# Patient Record
Sex: MALE | Race: WHITE
[De-identification: names, ages, dates, MRNs, and addresses within clinical notes are randomized per-mention and may not be internally consistent; named-entity substitution may affect disease eponyms.]

---

## 2018-10-04 ENCOUNTER — HOSPITAL ENCOUNTER (OUTPATIENT)
Dept: HOSPITAL 82 - ULTRASND | Age: 73
Discharge: HOME | DRG: 434 | End: 2018-10-04
Attending: INTERNAL MEDICINE
Payer: OTHER GOVERNMENT

## 2018-10-04 DIAGNOSIS — K74.69: Primary | ICD-10-CM

## 2018-10-04 PROCEDURE — P9047 ALBUMIN (HUMAN), 25%, 50ML: HCPCS

## 2018-10-04 PROCEDURE — 0W9G3ZZ DRAINAGE OF PERITONEAL CAVITY, PERCUTANEOUS APPROACH: ICD-10-PCS | Performed by: RADIOLOGY

## 2018-10-25 ENCOUNTER — HOSPITAL ENCOUNTER (EMERGENCY)
Dept: HOSPITAL 82 - ED | Age: 73
Discharge: ANOTHER HOSPITAL | DRG: 948 | End: 2018-10-25
Payer: OTHER GOVERNMENT

## 2018-10-25 VITALS — WEIGHT: 242.51 LBS | HEIGHT: 69 IN | BODY MASS INDEX: 35.92 KG/M2

## 2018-10-25 VITALS — SYSTOLIC BLOOD PRESSURE: 113 MMHG | DIASTOLIC BLOOD PRESSURE: 61 MMHG

## 2018-10-25 DIAGNOSIS — M10.9: ICD-10-CM

## 2018-10-25 DIAGNOSIS — K75.81: ICD-10-CM

## 2018-10-25 DIAGNOSIS — R27.0: ICD-10-CM

## 2018-10-25 DIAGNOSIS — K74.60: ICD-10-CM

## 2018-10-25 DIAGNOSIS — Z86.73: ICD-10-CM

## 2018-10-25 DIAGNOSIS — R41.0: Primary | ICD-10-CM

## 2019-01-24 ENCOUNTER — HOSPITAL ENCOUNTER (OUTPATIENT)
Dept: HOSPITAL 82 - ED | Age: 74
Setting detail: OBSERVATION
LOS: 2 days | Discharge: HOME HEALTH | DRG: 312 | End: 2019-01-26
Attending: INTERNAL MEDICINE | Admitting: INTERNAL MEDICINE
Payer: OTHER GOVERNMENT

## 2019-01-24 VITALS — BODY MASS INDEX: 35.73 KG/M2 | WEIGHT: 249.56 LBS | HEIGHT: 70 IN

## 2019-01-24 VITALS — DIASTOLIC BLOOD PRESSURE: 74 MMHG | SYSTOLIC BLOOD PRESSURE: 125 MMHG

## 2019-01-24 VITALS — SYSTOLIC BLOOD PRESSURE: 122 MMHG | DIASTOLIC BLOOD PRESSURE: 71 MMHG

## 2019-01-24 DIAGNOSIS — S50.311A: ICD-10-CM

## 2019-01-24 DIAGNOSIS — R55: Primary | ICD-10-CM

## 2019-01-24 DIAGNOSIS — S01.01XA: ICD-10-CM

## 2019-01-24 DIAGNOSIS — I50.9: ICD-10-CM

## 2019-01-24 DIAGNOSIS — I25.10: ICD-10-CM

## 2019-01-24 DIAGNOSIS — E83.42: ICD-10-CM

## 2019-01-24 DIAGNOSIS — N18.3: ICD-10-CM

## 2019-01-24 DIAGNOSIS — K74.69: ICD-10-CM

## 2019-01-24 DIAGNOSIS — Z79.4: ICD-10-CM

## 2019-01-24 DIAGNOSIS — T75.89XD: ICD-10-CM

## 2019-01-24 DIAGNOSIS — E11.22: ICD-10-CM

## 2019-01-24 DIAGNOSIS — Z95.1: ICD-10-CM

## 2019-01-24 DIAGNOSIS — M19.90: ICD-10-CM

## 2019-01-24 DIAGNOSIS — E87.5: ICD-10-CM

## 2019-01-24 DIAGNOSIS — Z79.1: ICD-10-CM

## 2019-01-24 DIAGNOSIS — K75.81: ICD-10-CM

## 2019-01-24 DIAGNOSIS — Z79.899: ICD-10-CM

## 2019-01-24 DIAGNOSIS — R18.8: ICD-10-CM

## 2019-01-24 DIAGNOSIS — K76.6: ICD-10-CM
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
## 2019-01-24 DIAGNOSIS — N17.9: ICD-10-CM
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
## 2019-01-24 DIAGNOSIS — E72.20: ICD-10-CM

## 2019-01-24 LAB
ALBUMIN SERPL-MCNC: 3.4 G/DL (ref 3.2–5)
ALP SERPL-CCNC: 168 U/L (ref 38–126)
ANION GAP SERPL CALCULATED.3IONS-SCNC: 14 MMOL/L
ANION GAP SERPL CALCULATED.3IONS-SCNC: 16 MMOL/L
AST SERPL-CCNC: 41 U/L (ref 19–48)
BASOPHILS NFR BLD AUTO: 1 % (ref 0–3)
BUN SERPL-MCNC: 23 MG/DL (ref 8–23)
BUN SERPL-MCNC: 23 MG/DL (ref 8–23)
BUN/CREAT SERPL: 12
BUN/CREAT SERPL: 13
CHLORIDE SERPL-SCNC: 105 MMOL/L (ref 95–108)
CHLORIDE SERPL-SCNC: 106 MMOL/L (ref 95–108)
CO2 SERPL-SCNC: 20 MMOL/L (ref 22–30)
CO2 SERPL-SCNC: 22 MMOL/L (ref 22–30)
CREAT SERPL-MCNC: 1.7 MG/DL (ref 0.7–1.3)
CREAT SERPL-MCNC: 1.9 MG/DL (ref 0.7–1.3)
EOSINOPHIL NFR BLD AUTO: 4 % (ref 0–8)
ERYTHROCYTE [DISTWIDTH] IN BLOOD BY AUTOMATED COUNT: 14.2 % (ref 11.5–15.5)
HCT VFR BLD AUTO: 40 % (ref 39–50)
HGB BLD-MCNC: 13.3 G/DL (ref 14–18)
IMM GRANULOCYTES NFR BLD: 0.6 % (ref 0–5)
LYMPHOCYTES NFR BLD: 11 % (ref 15–41)
MCH RBC QN AUTO: 34.3 PG  CALC (ref 26–32)
MCHC RBC AUTO-ENTMCNC: 33.3 G/L CALC (ref 32–36)
MCV RBC AUTO: 103.1 FL  CALC (ref 80–100)
MONOCYTES NFR BLD AUTO: 8 % (ref 2–13)
NEUTROPHILS # BLD AUTO: 4.86 THOU/UL (ref 1.82–7.42)
NEUTROPHILS NFR BLD AUTO: 76 % (ref 42–76)
PLATELET # BLD AUTO: 92 THOU/UL (ref 130–400)
POTASSIUM SERPL-SCNC: 6 MMOL/L (ref 3.5–5.1)
POTASSIUM SERPL-SCNC: 6 MMOL/L (ref 3.5–5.1)
PROT SERPL-MCNC: 7.4 G/DL (ref 6.3–8.2)
RBC # BLD AUTO: 3.88 MILL/UL (ref 4.7–6.1)
SODIUM SERPL-SCNC: 135 MMOL/L (ref 137–146)
SODIUM SERPL-SCNC: 136 MMOL/L (ref 137–146)

## 2019-01-24 PROCEDURE — 2W3CX1Z IMMOBILIZATION OF RIGHT LOWER ARM USING SPLINT: ICD-10-PCS | Performed by: EMERGENCY MEDICINE

## 2019-01-24 PROCEDURE — 0HQ0XZZ REPAIR SCALP SKIN, EXTERNAL APPROACH: ICD-10-PCS | Performed by: EMERGENCY MEDICINE

## 2019-01-25 VITALS — DIASTOLIC BLOOD PRESSURE: 79 MMHG | SYSTOLIC BLOOD PRESSURE: 131 MMHG

## 2019-01-25 VITALS — SYSTOLIC BLOOD PRESSURE: 118 MMHG | DIASTOLIC BLOOD PRESSURE: 78 MMHG

## 2019-01-25 VITALS — SYSTOLIC BLOOD PRESSURE: 104 MMHG | DIASTOLIC BLOOD PRESSURE: 51 MMHG

## 2019-01-25 VITALS — DIASTOLIC BLOOD PRESSURE: 70 MMHG | SYSTOLIC BLOOD PRESSURE: 118 MMHG

## 2019-01-25 VITALS — SYSTOLIC BLOOD PRESSURE: 114 MMHG | DIASTOLIC BLOOD PRESSURE: 70 MMHG

## 2019-01-25 VITALS — SYSTOLIC BLOOD PRESSURE: 104 MMHG | DIASTOLIC BLOOD PRESSURE: 59 MMHG

## 2019-01-25 LAB
ALBUMIN SERPL-MCNC: 2.8 G/DL (ref 3.2–5)
ALP SERPL-CCNC: 152 U/L (ref 38–126)
AMYLASE SERPL-CCNC: < 30 U/L (ref 30–110)
ANION GAP SERPL CALCULATED.3IONS-SCNC: 13 MMOL/L
AST SERPL-CCNC: 29 U/L (ref 19–48)
BASOPHILS NFR BLD AUTO: 1 % (ref 0–3)
BUN SERPL-MCNC: 20 MG/DL (ref 8–23)
BUN/CREAT SERPL: 12
CHLORIDE SERPL-SCNC: 105 MMOL/L (ref 95–108)
CO2 SERPL-SCNC: 25 MMOL/L (ref 22–30)
CREAT SERPL-MCNC: 1.7 MG/DL (ref 0.7–1.3)
EOSINOPHIL NFR BLD AUTO: 6 % (ref 0–8)
ERYTHROCYTE [DISTWIDTH] IN BLOOD BY AUTOMATED COUNT: 14 % (ref 11.5–15.5)
HCT VFR BLD AUTO: 35.4 % (ref 39–50)
HGB BLD-MCNC: 11.9 G/DL (ref 14–18)
IMM GRANULOCYTES NFR BLD: 0.4 % (ref 0–5)
LIPASE SERPL-CCNC: 58 U/L (ref 23–300)
LYMPHOCYTES NFR BLD: 16 % (ref 15–41)
MAGNESIUM SERPL-MCNC: 1.5 MG/DL (ref 1.6–2.3)
MCH RBC QN AUTO: 34.3 PG  CALC (ref 26–32)
MCHC RBC AUTO-ENTMCNC: 33.6 G/L CALC (ref 32–36)
MCV RBC AUTO: 102 FL  CALC (ref 80–100)
MONOCYTES NFR BLD AUTO: 9 % (ref 2–13)
NEUTROPHILS # BLD AUTO: 3.66 THOU/UL (ref 1.82–7.42)
NEUTROPHILS NFR BLD AUTO: 69 % (ref 42–76)
PLATELET # BLD AUTO: 75 THOU/UL (ref 130–400)
POTASSIUM SERPL-SCNC: 5.7 MMOL/L (ref 3.5–5.1)
PROT SERPL-MCNC: 6.4 G/DL (ref 6.3–8.2)
RBC # BLD AUTO: 3.47 MILL/UL (ref 4.7–6.1)
SODIUM SERPL-SCNC: 137 MMOL/L (ref 137–146)

## 2019-01-26 VITALS — DIASTOLIC BLOOD PRESSURE: 74 MMHG | SYSTOLIC BLOOD PRESSURE: 128 MMHG

## 2019-01-26 VITALS — SYSTOLIC BLOOD PRESSURE: 110 MMHG | DIASTOLIC BLOOD PRESSURE: 67 MMHG

## 2019-01-26 VITALS — SYSTOLIC BLOOD PRESSURE: 119 MMHG | DIASTOLIC BLOOD PRESSURE: 65 MMHG

## 2019-01-26 VITALS — DIASTOLIC BLOOD PRESSURE: 74 MMHG | SYSTOLIC BLOOD PRESSURE: 125 MMHG

## 2019-01-26 LAB
ALBUMIN SERPL-MCNC: 3.1 G/DL (ref 3.2–5)
ALP SERPL-CCNC: 166 U/L (ref 38–126)
ANION GAP SERPL CALCULATED.3IONS-SCNC: 13 MMOL/L
AST SERPL-CCNC: 32 U/L (ref 19–48)
BASOPHILS NFR BLD AUTO: 1 % (ref 0–3)
BUN SERPL-MCNC: 20 MG/DL (ref 8–23)
BUN/CREAT SERPL: 11
CHLORIDE SERPL-SCNC: 105 MMOL/L (ref 95–108)
CO2 SERPL-SCNC: 23 MMOL/L (ref 22–30)
CREAT SERPL-MCNC: 1.8 MG/DL (ref 0.7–1.3)
EOSINOPHIL NFR BLD AUTO: 6 % (ref 0–8)
ERYTHROCYTE [DISTWIDTH] IN BLOOD BY AUTOMATED COUNT: 13.7 % (ref 11.5–15.5)
HCT VFR BLD AUTO: 38.9 % (ref 39–50)
HGB BLD-MCNC: 13.2 G/DL (ref 14–18)
IMM GRANULOCYTES NFR BLD: 0.5 % (ref 0–5)
LYMPHOCYTES NFR BLD: 18 % (ref 15–41)
MAGNESIUM SERPL-MCNC: 1.5 MG/DL (ref 1.6–2.3)
MCH RBC QN AUTO: 34.1 PG  CALC (ref 26–32)
MCHC RBC AUTO-ENTMCNC: 33.9 G/L CALC (ref 32–36)
MCV RBC AUTO: 100.5 FL  CALC (ref 80–100)
MONOCYTES NFR BLD AUTO: 9 % (ref 2–13)
NEUTROPHILS # BLD AUTO: 4.18 THOU/UL (ref 1.82–7.42)
NEUTROPHILS NFR BLD AUTO: 66 % (ref 42–76)
PLATELET # BLD AUTO: 85 THOU/UL (ref 130–400)
POTASSIUM SERPL-SCNC: 5.3 MMOL/L (ref 3.5–5.1)
PROT SERPL-MCNC: 7 G/DL (ref 6.3–8.2)
RBC # BLD AUTO: 3.87 MILL/UL (ref 4.7–6.1)
SODIUM SERPL-SCNC: 136 MMOL/L (ref 137–146)

## 2019-02-14 ENCOUNTER — HOSPITAL ENCOUNTER (EMERGENCY)
Dept: HOSPITAL 82 - ED | Age: 74
Discharge: HOME | DRG: 950 | End: 2019-02-14
Payer: OTHER GOVERNMENT

## 2019-02-14 ENCOUNTER — HOSPITAL ENCOUNTER (OUTPATIENT)
Dept: HOSPITAL 82 - INF | Age: 74
Discharge: HOME | DRG: 434 | End: 2019-02-14
Attending: INTERNAL MEDICINE
Payer: OTHER GOVERNMENT

## 2019-02-14 VITALS — BODY MASS INDEX: 34.4 KG/M2 | WEIGHT: 240.3 LBS | HEIGHT: 70 IN

## 2019-02-14 VITALS — SYSTOLIC BLOOD PRESSURE: 92 MMHG | DIASTOLIC BLOOD PRESSURE: 44 MMHG

## 2019-02-14 VITALS — DIASTOLIC BLOOD PRESSURE: 57 MMHG | SYSTOLIC BLOOD PRESSURE: 110 MMHG

## 2019-02-14 DIAGNOSIS — S01.00XD: Primary | ICD-10-CM

## 2019-02-14 DIAGNOSIS — K75.81: ICD-10-CM
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
## 2019-02-14 DIAGNOSIS — K74.69: Primary | ICD-10-CM

## 2019-02-14 DIAGNOSIS — M10.9: ICD-10-CM

## 2019-02-14 DIAGNOSIS — E11.9: ICD-10-CM

## 2019-02-14 DIAGNOSIS — Z79.4: ICD-10-CM

## 2019-02-14 PROCEDURE — 0W9G3ZZ DRAINAGE OF PERITONEAL CAVITY, PERCUTANEOUS APPROACH: ICD-10-PCS | Performed by: RADIOLOGY

## 2019-02-14 PROCEDURE — P9047 ALBUMIN (HUMAN), 25%, 50ML: HCPCS

## 2019-02-21 ENCOUNTER — HOSPITAL ENCOUNTER (OUTPATIENT)
Dept: HOSPITAL 82 - ULTRASND | Age: 74
Discharge: HOME | DRG: 434 | End: 2019-02-21
Attending: INTERNAL MEDICINE
Payer: OTHER GOVERNMENT

## 2019-02-21 VITALS — DIASTOLIC BLOOD PRESSURE: 79 MMHG | SYSTOLIC BLOOD PRESSURE: 141 MMHG

## 2019-02-21 DIAGNOSIS — K74.69: Primary | ICD-10-CM

## 2019-02-21 PROCEDURE — 0W9G3ZZ DRAINAGE OF PERITONEAL CAVITY, PERCUTANEOUS APPROACH: ICD-10-PCS | Performed by: RADIOLOGY

## 2019-02-21 PROCEDURE — P9047 ALBUMIN (HUMAN), 25%, 50ML: HCPCS

## 2019-02-25 ENCOUNTER — HOSPITAL ENCOUNTER (OUTPATIENT)
Dept: HOSPITAL 82 - LAB | Age: 74
Discharge: HOME | DRG: 645 | End: 2019-02-25
Attending: INTERNAL MEDICINE
Payer: OTHER GOVERNMENT

## 2019-02-25 DIAGNOSIS — E78.49: ICD-10-CM

## 2019-02-25 DIAGNOSIS — E03.9: Primary | ICD-10-CM

## 2019-02-25 DIAGNOSIS — I10: ICD-10-CM

## 2019-02-26 ENCOUNTER — HOSPITAL ENCOUNTER (OUTPATIENT)
Dept: HOSPITAL 82 - DI | Age: 74
Discharge: HOME | End: 2019-02-26
Attending: ORTHOPAEDIC SURGERY
Payer: OTHER GOVERNMENT

## 2019-02-26 DIAGNOSIS — S52.024D: Primary | ICD-10-CM

## 2019-02-28 ENCOUNTER — HOSPITAL ENCOUNTER (OUTPATIENT)
Dept: HOSPITAL 82 - INF | Age: 74
Discharge: HOME | DRG: 434 | End: 2019-02-28
Attending: INTERNAL MEDICINE
Payer: OTHER GOVERNMENT

## 2019-02-28 VITALS — SYSTOLIC BLOOD PRESSURE: 97 MMHG | DIASTOLIC BLOOD PRESSURE: 55 MMHG

## 2019-02-28 DIAGNOSIS — K74.69: Primary | ICD-10-CM

## 2019-02-28 PROCEDURE — 0W9G3ZZ DRAINAGE OF PERITONEAL CAVITY, PERCUTANEOUS APPROACH: ICD-10-PCS | Performed by: RADIOLOGY

## 2019-02-28 PROCEDURE — P9047 ALBUMIN (HUMAN), 25%, 50ML: HCPCS

## 2019-03-18 ENCOUNTER — HOSPITAL ENCOUNTER (OUTPATIENT)
Dept: HOSPITAL 82 - ULTRASND | Age: 74
Discharge: HOME | DRG: 434 | End: 2019-03-18
Attending: INTERNAL MEDICINE
Payer: OTHER GOVERNMENT

## 2019-03-18 VITALS — SYSTOLIC BLOOD PRESSURE: 100 MMHG | DIASTOLIC BLOOD PRESSURE: 58 MMHG

## 2019-03-18 DIAGNOSIS — R53.83: ICD-10-CM

## 2019-03-18 DIAGNOSIS — K74.69: Primary | ICD-10-CM

## 2019-03-18 DIAGNOSIS — E11.65: ICD-10-CM

## 2019-03-18 DIAGNOSIS — I10: ICD-10-CM

## 2019-03-18 LAB
ANION GAP SERPL CALCULATED.3IONS-SCNC: 12 MMOL/L
BUN SERPL-MCNC: 27 MG/DL (ref 8–23)
BUN/CREAT SERPL: 17
CHLORIDE SERPL-SCNC: 108 MMOL/L (ref 95–108)
CO2 SERPL-SCNC: 24 MMOL/L (ref 22–30)
CREAT SERPL-MCNC: 1.6 MG/DL (ref 0.7–1.3)
POTASSIUM SERPL-SCNC: 4.5 MMOL/L (ref 3.5–5.1)
SODIUM SERPL-SCNC: 139 MMOL/L (ref 137–146)

## 2019-03-18 PROCEDURE — P9047 ALBUMIN (HUMAN), 25%, 50ML: HCPCS

## 2019-03-18 PROCEDURE — 0W9G3ZZ DRAINAGE OF PERITONEAL CAVITY, PERCUTANEOUS APPROACH: ICD-10-PCS | Performed by: RADIOLOGY

## 2019-04-09 ENCOUNTER — HOSPITAL ENCOUNTER (EMERGENCY)
Dept: HOSPITAL 82 - ED | Age: 74
Discharge: HOME | DRG: 948 | End: 2019-04-09
Payer: OTHER GOVERNMENT

## 2019-04-09 VITALS — BODY MASS INDEX: 37.87 KG/M2 | WEIGHT: 264.55 LBS | HEIGHT: 70 IN

## 2019-04-09 VITALS — SYSTOLIC BLOOD PRESSURE: 109 MMHG | DIASTOLIC BLOOD PRESSURE: 55 MMHG

## 2019-04-09 DIAGNOSIS — R18.8: Primary | ICD-10-CM

## 2019-04-09 DIAGNOSIS — K74.60: ICD-10-CM

## 2019-04-09 DIAGNOSIS — K75.81: ICD-10-CM

## 2019-04-09 DIAGNOSIS — Z79.4: ICD-10-CM

## 2019-04-09 DIAGNOSIS — E11.9: ICD-10-CM

## 2019-04-09 LAB
INR PPP: 1.1 RATIO (ref 0.7–1.3)
PROTHROMBIN TIME: 11.2 SECONDS (ref 9–12.5)

## 2019-04-09 PROCEDURE — 0W9G3ZZ DRAINAGE OF PERITONEAL CAVITY, PERCUTANEOUS APPROACH: ICD-10-PCS | Performed by: RADIOLOGY

## 2019-04-09 PROCEDURE — P9047 ALBUMIN (HUMAN), 25%, 50ML: HCPCS

## 2019-08-13 ENCOUNTER — HOSPITAL ENCOUNTER (EMERGENCY)
Dept: HOSPITAL 82 - ED | Age: 74
Discharge: HOME | DRG: 552 | End: 2019-08-13
Payer: OTHER GOVERNMENT

## 2019-08-13 VITALS — HEIGHT: 70 IN | BODY MASS INDEX: 35.57 KG/M2 | WEIGHT: 248.46 LBS

## 2019-08-13 VITALS — DIASTOLIC BLOOD PRESSURE: 60 MMHG | SYSTOLIC BLOOD PRESSURE: 119 MMHG

## 2019-08-13 DIAGNOSIS — S16.1XXA: Primary | ICD-10-CM

## 2019-08-13 DIAGNOSIS — S00.03XA: ICD-10-CM

## 2019-08-13 DIAGNOSIS — S80.02XA: ICD-10-CM
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
## 2019-08-13 DIAGNOSIS — Z79.4: ICD-10-CM

## 2019-08-13 DIAGNOSIS — S40.012A: ICD-10-CM
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
## 2019-08-13 DIAGNOSIS — E11.9: ICD-10-CM

## 2020-06-22 ENCOUNTER — HOSPITAL ENCOUNTER (INPATIENT)
Dept: HOSPITAL 82 - ED | Age: 75
LOS: 2 days | Discharge: HOSPICE | DRG: 441 | End: 2020-06-24
Attending: INTERNAL MEDICINE | Admitting: INTERNAL MEDICINE
Payer: OTHER GOVERNMENT

## 2020-06-22 VITALS — WEIGHT: 238.37 LBS | HEIGHT: 70 IN | BODY MASS INDEX: 34.12 KG/M2

## 2020-06-22 VITALS — DIASTOLIC BLOOD PRESSURE: 75 MMHG | SYSTOLIC BLOOD PRESSURE: 126 MMHG

## 2020-06-22 VITALS — DIASTOLIC BLOOD PRESSURE: 77 MMHG | SYSTOLIC BLOOD PRESSURE: 134 MMHG

## 2020-06-22 DIAGNOSIS — Z79.4: ICD-10-CM

## 2020-06-22 DIAGNOSIS — E11.22: ICD-10-CM

## 2020-06-22 DIAGNOSIS — K72.10: Primary | ICD-10-CM

## 2020-06-22 DIAGNOSIS — K76.6: ICD-10-CM

## 2020-06-22 DIAGNOSIS — Z66: ICD-10-CM

## 2020-06-22 DIAGNOSIS — Z20.828: ICD-10-CM

## 2020-06-22 DIAGNOSIS — K75.81: ICD-10-CM

## 2020-06-22 DIAGNOSIS — N39.0: ICD-10-CM

## 2020-06-22 DIAGNOSIS — N18.4: ICD-10-CM

## 2020-06-22 DIAGNOSIS — R18.8: ICD-10-CM

## 2020-06-22 DIAGNOSIS — N17.9: ICD-10-CM

## 2020-06-22 DIAGNOSIS — E87.5: ICD-10-CM

## 2020-06-22 DIAGNOSIS — Z95.1: ICD-10-CM

## 2020-06-22 DIAGNOSIS — I95.9: ICD-10-CM

## 2020-06-22 DIAGNOSIS — K74.69: ICD-10-CM

## 2020-06-22 DIAGNOSIS — Z95.5: ICD-10-CM

## 2020-06-22 DIAGNOSIS — D64.9: ICD-10-CM

## 2020-06-22 DIAGNOSIS — Z51.5: ICD-10-CM

## 2020-06-22 DIAGNOSIS — E86.0: ICD-10-CM

## 2020-06-22 DIAGNOSIS — K76.7: ICD-10-CM

## 2020-06-22 DIAGNOSIS — I25.10: ICD-10-CM

## 2020-06-22 LAB
ALBUMIN SERPL-MCNC: 2.7 G/DL (ref 3.2–5)
ALP SERPL-CCNC: 244 U/L (ref 38–126)
AMORPH SED URNS QL MICRO: (no result) HPF
ANION GAP SERPL CALCULATED.3IONS-SCNC: 16 MMOL/L
AST SERPL-CCNC: 33 U/L (ref 19–48)
BASOPHILS NFR BLD AUTO: 1 % (ref 0–3)
BILIRUB UR QL STRIP.AUTO: NEGATIVE
BUN SERPL-MCNC: 66 MG/DL (ref 8–23)
BUN/CREAT SERPL: 15
CHLORIDE SERPL-SCNC: 107 MMOL/L (ref 95–108)
CO2 SERPL-SCNC: 17 MMOL/L (ref 22–30)
COLOR UR AUTO: YELLOW
CREAT SERPL-MCNC: 4.4 MG/DL (ref 0.7–1.3)
EOSINOPHIL NFR BLD AUTO: 3 % (ref 0–8)
ERYTHROCYTE [DISTWIDTH] IN BLOOD BY AUTOMATED COUNT: 14.7 % (ref 11.5–15.5)
ETHANOL SERPL-MCNC: 0 MG/DL (ref 0–30)
GLUCOSE UR STRIP.AUTO-MCNC: 100 MG/DL
HCT VFR BLD AUTO: 30.9 % (ref 39–50)
HGB BLD-MCNC: 10 G/DL (ref 14–18)
HGB UR QL STRIP.AUTO: (no result)
IMM GRANULOCYTES NFR BLD: 0.4 % (ref 0–5)
INR PPP: 1.2 RATIO (ref 0.7–1.3)
KETONES UR STRIP.AUTO-MCNC: NEGATIVE MG/DL
LEUKOCYTE ESTERASE UR QL STRIP.AUTO: (no result)
LIPASE SERPL-CCNC: 160 U/L (ref 23–300)
LYMPHOCYTES NFR BLD: 5 % (ref 15–41)
MCH RBC QN AUTO: 33 PG  CALC (ref 26–32)
MCHC RBC AUTO-ENTMCNC: 32.4 G/DL CAL (ref 32–36)
MCV RBC AUTO: 102 FL  CALC (ref 80–100)
MONOCYTES NFR BLD AUTO: 7 % (ref 2–13)
MYOGLOBIN SERPL-MCNC: 212 NG/ML (ref 0–121)
NEUTROPHILS # BLD AUTO: 6.47 THOU/UL (ref 1.82–7.42)
NEUTROPHILS NFR BLD AUTO: 84 % (ref 42–76)
NITRITE UR QL STRIP.AUTO: NEGATIVE
PH UR STRIP.AUTO: 5 [PH] (ref 4.5–8)
PLATELET # BLD AUTO: 75 THOU/UL (ref 130–400)
POTASSIUM SERPL-SCNC: 5.5 MMOL/L (ref 3.5–5.1)
PROT SERPL-MCNC: 6.7 G/DL (ref 6.3–8.2)
PROT UR QL STRIP.AUTO: 30 MG/DL
PROTHROMBIN TIME: 12.6 SECONDS (ref 9–12.5)
RBC # BLD AUTO: 3.03 MILL/UL (ref 4.7–6.1)
RBC #/AREA URNS HPF: (no result) RBC/HPF (ref 0–5)
SODIUM SERPL-SCNC: 134 MMOL/L (ref 137–146)
SP GR UR STRIP.AUTO: >=1.03
SQUAMOUS URNS QL MICRO: (no result) EPI/HPF
UROBILINOGEN UR QL STRIP.AUTO: 0.2 E.U./DL
WBC #/AREA URNS HPF: (no result) WBC/HPF (ref 0–5)

## 2020-06-22 NOTE — NUR
SPOKE WITH PT YAMILE BOYCE WHO WAS UPDATED ON EVENTS LEADING TO PT BEING
BROUGHT TO ED. PTS WIFE STATES "HE HAS BEEN GOING IN AND OUT ALL WEEKEND, BUT
HE MUST HAVE HIS PARACENTESIS BECAUSE HE MISSED IT LAST WEEK". WIFE FURTHER
STATES "MY HUSBAD IS ENDSTAGE AND HE IS DYING". QUESTIONED WIFE ON CODE STATUS
, WIFE STATES HE IS A DNR".

## 2020-06-22 NOTE — NUR
RECEIVED CALL FROM PATTI JASSO, PATIENT CARE COORDINATOR FOR HOSPICE WHERE
PATIENT WAS UNDER SERVICE. SHE ADVISED THAT DUE TO ADMISSION, PATIENT NO
LONGER QUALIFIES FOR THEIR SERVICES. WILL SEND OVER ANY INFORMATION THAT THEY
HAVE ON FILE.

## 2020-06-22 NOTE — NUR
PT ASSESSED.  RESTING ON STRETCHER.  AWAKE AND ALERT.  IV FLUID INFUSING.
MONITORS IN PLACE.  PT SKIN PINK WARM AND DRY

## 2020-06-22 NOTE — NUR
PT ARRIVED TO THE FLOOR VIA STRETCHER, PT TRANSFERED TO THE BED X@ ASSISTED.
VS OBTAINED AND ASSESSMENT COMPLETED. PT ALERT TO SELF PT IS DROWSY.
RESPIRATIONS EVEN AND UNLABORED, LUNGS SOUND DIMINISHED. PEDAL PULSES WEAK.
PT DENIES ANY PAIN OR DISCOMFORT. PICC IN HARSHA, PATIENT, DRESSING COMING OFF,
NEW DRESSING APPLIED. BED ALARM ACTIVE FOR PT SAFETY. WILL CONTINUE TO
MONITOR.

## 2020-06-23 VITALS — DIASTOLIC BLOOD PRESSURE: 76 MMHG | SYSTOLIC BLOOD PRESSURE: 134 MMHG

## 2020-06-23 VITALS — SYSTOLIC BLOOD PRESSURE: 92 MMHG | DIASTOLIC BLOOD PRESSURE: 58 MMHG

## 2020-06-23 VITALS — DIASTOLIC BLOOD PRESSURE: 78 MMHG | SYSTOLIC BLOOD PRESSURE: 126 MMHG

## 2020-06-23 VITALS — SYSTOLIC BLOOD PRESSURE: 88 MMHG | DIASTOLIC BLOOD PRESSURE: 56 MMHG

## 2020-06-23 VITALS — SYSTOLIC BLOOD PRESSURE: 98 MMHG | DIASTOLIC BLOOD PRESSURE: 62 MMHG

## 2020-06-23 VITALS — DIASTOLIC BLOOD PRESSURE: 64 MMHG | SYSTOLIC BLOOD PRESSURE: 108 MMHG

## 2020-06-23 VITALS — SYSTOLIC BLOOD PRESSURE: 85 MMHG | DIASTOLIC BLOOD PRESSURE: 36 MMHG

## 2020-06-23 VITALS — DIASTOLIC BLOOD PRESSURE: 54 MMHG | SYSTOLIC BLOOD PRESSURE: 102 MMHG

## 2020-06-23 VITALS — DIASTOLIC BLOOD PRESSURE: 56 MMHG | SYSTOLIC BLOOD PRESSURE: 102 MMHG

## 2020-06-23 LAB
ANION GAP SERPL CALCULATED.3IONS-SCNC: 12 MMOL/L
BUN SERPL-MCNC: 62 MG/DL (ref 8–23)
BUN/CREAT SERPL: 13
CHLORIDE SERPL-SCNC: 110 MMOL/L (ref 95–108)
CO2 SERPL-SCNC: 20 MMOL/L (ref 22–30)
CREAT SERPL-MCNC: 4.6 MG/DL (ref 0.7–1.3)
POTASSIUM SERPL-SCNC: 4.3 MMOL/L (ref 3.5–5.1)
SODIUM SERPL-SCNC: 138 MMOL/L (ref 137–146)

## 2020-06-23 PROCEDURE — 0W9G3ZZ DRAINAGE OF PERITONEAL CAVITY, PERCUTANEOUS APPROACH: ICD-10-PCS | Performed by: RADIOLOGY

## 2020-06-23 NOTE — NUR
PT IN BED WITH EYES CLOSED.  RESTING WITH NO COMPLICATIONS NOTED.  CONTINUES
ON TELEMTRY.  MEAL INTAKE FAIR FOR DINNER WITH ASSIST OF WIFE.  CONTINUES ON
NA 0.9% AT 100ML/HR AND TOLERATING WELL.  CONTIUES TO HAVE PICC IN PLACE.
ACCUCHECK 150 AT DINNERTIME.  ABDOMEN DISTENDED WITH BOWEL SOUNDS PRESENT.  NO
BM NOTED.  NO RES[IRATORY DISTRESS NOTED.  WILL CONTINUE TO OBSERVE PATIENT.
BED ALARM ON FOR SAFETY.  BED IN LOWEST POSITION.  CALL LIGHT IS WITHIN REACH.

## 2020-06-23 NOTE — NUR
PT RESTING IN BED, ALERT TO SELF. RESPIRATIONS EVEN AND UNLABROED ON RA. PEDAL
PULSES WEAK. BED ALARM ACTIVE FOR PT SAFETY. WILL CONTOINUE TO MONITOR.

## 2020-06-24 VITALS — DIASTOLIC BLOOD PRESSURE: 58 MMHG | SYSTOLIC BLOOD PRESSURE: 88 MMHG

## 2020-06-24 VITALS — SYSTOLIC BLOOD PRESSURE: 108 MMHG | DIASTOLIC BLOOD PRESSURE: 65 MMHG

## 2020-06-24 VITALS — DIASTOLIC BLOOD PRESSURE: 64 MMHG | SYSTOLIC BLOOD PRESSURE: 100 MMHG

## 2020-06-24 NOTE — NUR
PT RESTING IN BED AWAKE. PT IS ALERT. PT IS NON VERBAL AT THIS TIME. RESP ARE
EVEN AND UNLABORED. SHIFT ASSESSMENT COMPLETED AT THIS TIME. IV PATETN X1. D10
INFUSED FOR LOW BLOOD SUGAR DUE TO PATIETN REFUSING TO DRINK ORANGE JUICE. AND
GLUCOSE OF 60. BED ALARM IN REACH. CALL LIGHT IN REACH. WILL CONTINUE TO
MONITOR.

## 2020-06-24 NOTE — NUR
PT RESTING IN BED, FREE FROM DISTRESS AT THIS TIME. BED ALARM ACTIVE FOR PT
SAFETY. WILL CONTINUE TO MONITOR.

## 2020-06-24 NOTE — NUR
Discharge instructions given. Patient verbalizes understanding of same.
Discharged in stable condition via Medical Transport to Home with
*Other. All belongings sent with pt.

## 2020-06-24 NOTE — NUR
PT RESTING IN BED WITH EYES CLOSED RESP ARE EVEN AND UNLABORED. CALL LIGHT IN
REACH. WILL CONTINEU TO MONITOR.

## 2020-06-24 NOTE — NUR
PT RESTING IN BED AWAKE. PT REMAINS NON VERBAL AT THIS TIME. PT CONTINUES TO
REFUSE TO EAT. AWAITING CASE MANAGEMENT TO ARRANGE TRANSPORT FOR DISCHARGE.
BED ALARM CONTINUES TO BE IN PLACE FOR PATIENT SAFETY. WILL CONTINUE TO
MONITOR.

## 2020-08-30 NOTE — NUR
PT HOME MEDS IN PHARMACY SINCE6/23/20. PER POLICY, MEDS WERE DESTROYED AFTER
30 DAYS. I DESTROYED MEDS TODAY